# Patient Record
Sex: FEMALE | Race: WHITE | NOT HISPANIC OR LATINO | Employment: STUDENT | URBAN - METROPOLITAN AREA
[De-identification: names, ages, dates, MRNs, and addresses within clinical notes are randomized per-mention and may not be internally consistent; named-entity substitution may affect disease eponyms.]

---

## 2023-01-03 ENCOUNTER — OFFICE VISIT (OUTPATIENT)
Dept: FAMILY MEDICINE CLINIC | Facility: CLINIC | Age: 23
End: 2023-01-03
Payer: COMMERCIAL

## 2023-01-03 VITALS
HEART RATE: 74 BPM | OXYGEN SATURATION: 100 % | TEMPERATURE: 98.6 F | SYSTOLIC BLOOD PRESSURE: 103 MMHG | DIASTOLIC BLOOD PRESSURE: 71 MMHG | BODY MASS INDEX: 23.69 KG/M2 | HEIGHT: 65 IN | WEIGHT: 142.2 LBS

## 2023-01-03 DIAGNOSIS — Z78.9 HEPATITIS B IMMUNE: ICD-10-CM

## 2023-01-03 DIAGNOSIS — Z23 NEED FOR IMMUNIZATION AGAINST INFLUENZA: Primary | ICD-10-CM

## 2023-01-03 DIAGNOSIS — Z11.1 VISIT FOR TB SKIN TEST: ICD-10-CM

## 2023-01-03 DIAGNOSIS — R41.840 ATTENTION AND CONCENTRATION DEFICIT: ICD-10-CM

## 2023-01-03 DIAGNOSIS — Z11.59 NEED FOR HEPATITIS C SCREENING TEST: ICD-10-CM

## 2023-01-03 DIAGNOSIS — Z92.29 HISTORY OF MMR VACCINATION: ICD-10-CM

## 2023-01-03 LAB
HBV SURFACE AG SERPL QL IA: NORMAL
HCV AB SER DONR QL: NORMAL

## 2023-01-03 PROCEDURE — 99213 OFFICE O/P EST LOW 20 MIN: CPT | Performed by: NURSE PRACTITIONER

## 2023-01-03 PROCEDURE — 90686 IIV4 VACC NO PRSV 0.5 ML IM: CPT | Performed by: NURSE PRACTITIONER

## 2023-01-03 PROCEDURE — 86803 HEPATITIS C AB TEST: CPT | Performed by: NURSE PRACTITIONER

## 2023-01-03 PROCEDURE — 86765 RUBEOLA ANTIBODY: CPT | Performed by: NURSE PRACTITIONER

## 2023-01-03 PROCEDURE — 86735 MUMPS ANTIBODY: CPT | Performed by: NURSE PRACTITIONER

## 2023-01-03 PROCEDURE — 87340 HEPATITIS B SURFACE AG IA: CPT | Performed by: NURSE PRACTITIONER

## 2023-01-03 PROCEDURE — 90471 IMMUNIZATION ADMIN: CPT | Performed by: NURSE PRACTITIONER

## 2023-01-03 PROCEDURE — 86762 RUBELLA ANTIBODY: CPT | Performed by: NURSE PRACTITIONER

## 2023-01-03 NOTE — PROGRESS NOTES
Chief Complaint  Establish Care    Subjective         Jonathan Beck presents to [facility]  HPI     Here today to establish care- Needs to get flu vaccine and TB skin test.  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 Hard to stay focused when reading her assigned chapters.    PHQ-2 Total Score: 0  PHQ-9 Total Score: 0    Review of Systems    Social History     Socioeconomic History   • Marital status: Single   Tobacco Use   • Smoking status: Never   • Smokeless tobacco: Never        Objective     Vitals:    01/03/23 1317   BP: 103/71   BP Location: Right arm   Patient Position: Sitting   Cuff Size: Adult   Pulse: 74   Temp: 98.6 °F (37 °C)   TempSrc: Temporal   SpO2: 100%   Weight: 64.5 kg (142 lb 3.2 oz)   Height: 165.1 cm (65\")        Body mass index is 23.66 kg/m².    Wt Readings from Last 3 Encounters:   01/03/23 64.5 kg (142 lb 3.2 oz)   09/14/21 63.5 kg (140 lb)       BP Readings from Last 3 Encounters:   01/03/23 103/71   09/14/21 100/60       BMI is within normal parameters. No other follow-up for BMI required.    Physical Exam  Vitals reviewed.   Constitutional:       Appearance: Normal appearance.   HENT:      Head: Normocephalic and atraumatic.   Eyes:      Conjunctiva/sclera: Conjunctivae normal.      Pupils: Pupils are equal, round, and reactive to light.   Cardiovascular:      Rate and Rhythm: Normal rate and regular rhythm.      Pulses: Normal pulses.      Heart sounds: Normal heart sounds.   Pulmonary:      Effort: Pulmonary effort is normal.      Breath sounds: Normal breath sounds.   Abdominal:      General: Bowel sounds are normal.      Palpations: Abdomen is soft.   Musculoskeletal:      Cervical back: Normal range of motion.   Skin:     General: Skin is warm and dry.   Neurological:      Mental Status: She is alert and oriented to person, place, and time.    Psychiatric:         Mood and Affect: Mood normal.         Behavior: Behavior normal.         Thought Content: Thought content normal.         Judgment: Judgment normal.        Result Review :   The following data was reviewed by: AARON Reese on 01/03/2023:      Procedures    Assessment and Plan   Diagnoses and all orders for this visit:    1. Need for immunization against influenza (Primary)  -     FluLaval/Fluzone >6 mos (9200-9715)    2. Need for hepatitis C screening test  -     Hepatitis C Antibody    3. Attention and concentration deficit  -     Ambulatory Referral to Psychiatry    4. Hepatitis B immune  -     Hepatitis B Surface Antigen    5. History of MMR vaccination  -     Measles / Mumps / Rubella Immunity    6. Visit for TB skin test  -     TB Skin Test    .tmadd    Follow Up   Return if symptoms worsen or fail to improve.  Patient was given instructions and counseling regarding her condition or for health maintenance advice. Please see specific information pulled into the AVS if appropriate.     Please note that portions of this note were completed with a voice recognition program.    Electronically signed by AARON Reese  01/03/2023, 17:02 EST

## 2023-01-05 LAB
MEV IGG SER IA-ACNC: <13.5 AU/ML
MUV IGG SER IA-ACNC: 11.7 AU/ML
RUBV IGG SERPL IA-ACNC: <0.9 INDEX

## 2023-01-06 ENCOUNTER — CLINICAL SUPPORT (OUTPATIENT)
Dept: FAMILY MEDICINE CLINIC | Facility: CLINIC | Age: 23
End: 2023-01-06
Payer: COMMERCIAL

## 2023-01-06 DIAGNOSIS — Z23 NEED FOR TUBERCULOSIS VACCINATION: ICD-10-CM

## 2023-01-06 LAB
INDURATION: 0 MM (ref 0–10)
TB SKIN TEST: NORMAL

## 2023-01-06 PROCEDURE — 86580 TB INTRADERMAL TEST: CPT | Performed by: NURSE PRACTITIONER

## 2023-01-09 ENCOUNTER — PATIENT ROUNDING (BHMG ONLY) (OUTPATIENT)
Dept: FAMILY MEDICINE CLINIC | Facility: CLINIC | Age: 23
End: 2023-01-09
Payer: COMMERCIAL

## 2023-01-12 ENCOUNTER — OFFICE VISIT (OUTPATIENT)
Dept: PSYCHIATRY | Facility: CLINIC | Age: 23
End: 2023-01-12
Payer: COMMERCIAL

## 2023-01-12 ENCOUNTER — CLINICAL SUPPORT (OUTPATIENT)
Dept: FAMILY MEDICINE CLINIC | Facility: CLINIC | Age: 23
End: 2023-01-12
Payer: COMMERCIAL

## 2023-01-12 VITALS
SYSTOLIC BLOOD PRESSURE: 101 MMHG | WEIGHT: 142 LBS | BODY MASS INDEX: 23.66 KG/M2 | DIASTOLIC BLOOD PRESSURE: 51 MMHG | HEIGHT: 65 IN | HEART RATE: 74 BPM

## 2023-01-12 DIAGNOSIS — Z23 NEED FOR MEASLES-MUMPS-RUBELLA (MMR) VACCINE: Primary | ICD-10-CM

## 2023-01-12 DIAGNOSIS — F90.0 ATTENTION DEFICIT HYPERACTIVITY DISORDER (ADHD), PREDOMINANTLY INATTENTIVE TYPE: Primary | ICD-10-CM

## 2023-01-12 PROCEDURE — 90792 PSYCH DIAG EVAL W/MED SRVCS: CPT | Performed by: PSYCHIATRY & NEUROLOGY

## 2023-01-12 PROCEDURE — 90471 IMMUNIZATION ADMIN: CPT | Performed by: NURSE PRACTITIONER

## 2023-01-12 PROCEDURE — 90707 MMR VACCINE SC: CPT | Performed by: NURSE PRACTITIONER

## 2023-01-12 RX ORDER — ATOMOXETINE 40 MG/1
40 CAPSULE ORAL DAILY
Qty: 30 CAPSULE | Refills: 0 | Status: SHIPPED | OUTPATIENT
Start: 2023-01-12 | End: 2023-02-11

## 2023-01-12 NOTE — PROGRESS NOTES
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

## 2023-01-12 NOTE — PROGRESS NOTES
"[facility]  Initial Evaluation    Referring Provider:  Luz Smith, APRN  [address] [address]    Chief Complaint: \"I was just talking to my doctor about my focus ability with school... since I've been back from [facility], I've noticed it more... I am in a competitive program... I've never had to try as hard as I do now... I'm the one in the classroom that is so much more behind anyone else... if everyone else in the class has to study for an hour, I have to study for 2-3.\"    History of Present Illness: Jonathan Beck is a 22 y.o. female who presents today for initial evaluation regarding possible ADHD. She presents unaccompanied in no acute distress and engages with me appropriately.    History is positive for signs/symptoms suggestive of ADHD: trouble concentrating, trouble completing tasks, making errors in routine tasks, issues remembering obligations, trouble with organization, fidgeting, and associated irritability/anxiety with these deficits. No hx SI. Psychiatric screening is negative for pathognomonic history of hypomania/camelia. Dating boyfriend 2.5 years; this is going okay.    I have counseled the patient with regard to diagnoses and the recommended treatment regimen as documented below: I will begin atomoxetine per patient preference and have advised this medication can contribute to issues with BP and HR, appetite suppression, insomnia, and mood changes with worst case scenario being new-onset SI; patient contracts for safety appropriately. Patient acknowledges the diagnoses per my rendered interpretation. Patient demonstrates awareness/understanding of viable alternatives for treatment as well as potential risks, benefits, and side effects associated with this regimen and is amenable to proceed in this fashion.     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  Education: studying for OT  Pertinent developmental history: denies; no abuse hx  Pertinent legal history: denies  Hobbies/interests: spending time with friends  Faith: \"a little bit\" Anabaptism  Exercise: goes to a gym 4-5 times weekly  Dietary habits: defer  Sleep hygiene: defer  Social habits: no pertinent issues  Sunlight: no concern for under-exposure  Caffeine intake: no pertinent issues; 1 cup of coffee in AM  Hydration habits: no pertinent issues   history: denies    Social History     Socioeconomic History   • Marital status: Single   Tobacco Use   • Smoking status: Never   • Smokeless tobacco: Never   Vaping Use   • Vaping Use: Never used   Substance and Sexual Activity   • Alcohol use: Yes     Comment: SOCIALLY   • Drug use: Never   • Sexual activity: Yes     Partners: Male     Access to Firearms: denies    Tobacco use counseling/intervention: N/A, patient does not use tobacco; patient was counseled with regard to risks of tobacco use.    PHQ-9 Depression Screening  PHQ-9 Total Score: 1    Little interest or pleasure in doing things? 0-->not at all   Feeling down, depressed, or hopeless? 0-->not at all   Trouble falling or staying asleep, or sleeping too much? 0-->not at all   Feeling tired or having little energy? 0-->not at all   Poor appetite or overeating? 0-->not at all   Feeling bad about yourself - or that you are a failure or have let yourself or your family down? 0-->not at all   Trouble concentrating on things, such as reading the newspaper or watching television? 1-->several days   Moving or speaking so slowly that other people could have " noticed? Or the opposite - being so fidgety or restless that you have been moving around a lot more than usual? 0-->not at all   Thoughts that you would be better off dead, or of hurting yourself in some way? 0-->not at all   PHQ-9 Total Score 1     BEATRICE-7  Feeling nervous, anxious or on edge: Not at all  Not being able to stop or control worrying: Not at all  Worrying too much about different things: Not at all  Trouble Relaxing: Not at all  Being so restless that it is hard to sit still: Not at all  Feeling afraid as if something awful might happen: Not at all  Becoming easily annoyed or irritable: Not at all  BEATRICE 7 Total Score: 0    ASRS-v1.1  Part A  4/6  Part B  11/12    Total  15/18    Problem List:  Patient Active Problem List   Diagnosis   • Attention deficit hyperactivity disorder (ADHD), predominantly inattentive type     Allergy:   Allergies   Allergen Reactions   • Minocycline Hcl Hives      Discontinued Medications:  There are no discontinued medications.    Current Medications:   Current Outpatient Medications   Medication Sig Dispense Refill   • aspirin-acetaminophen-caffeine (EXCEDRIN MIGRAINE) 250-250-65 MG per tablet Take 1 tablet by mouth Every 6 (Six) Hours As Needed for Headache.     • cetirizine (zyrTEC) 10 MG tablet Take 10 mg by mouth Daily As Needed.     • atomoxetine (Strattera) 40 MG capsule Take 1 capsule by mouth Daily for 30 days. 30 capsule 0     No current facility-administered medications for this visit.     Past Medical History:  History reviewed. No pertinent past medical history.  Past Surgical History:  Past Surgical History:   Procedure Laterality Date   • TONSILLECTOMY       Family History:   History reviewed. No pertinent family history.    Mental Status Exam:   Appearance: well-groomed, sits upright, age-appropriate, normal habitus  Behavior: calm, cooperative, appropriate in demeanor, appropriate eye-contact  Mood/affect: euthymic / mood-congruent and appropriate in both  "range and amplitude  Speech: within expected variance; appropriate rate, appropriate rhythm, appropriate tone; non-pressured  Thought Process: linear, goal-directed; no FOI or ISHAN; abstraction intact  Thought Content: coherent, devoid of overt delusions/perceptual disturbances  SI/HI: denies both SI and HI; exhibits future-orientation, self-advocates appropriately, no regular self-harm, no appreciable intent  Memory: no overt deficits  Orientation: oriented to person/place/time/situation  Concentration: appropriate during interview, +subjective deficits  Intellectual capacity: presumptively average  Insight: fair by given history/exam  Judgment: appropriate by given history/exam  Psychomotor: no appreciable latency/retardation/agitation/tremor  Gait: WNL    Review of Systems:  Review of Systems   Constitutional: Negative for activity change, appetite change and unexpected weight change.   HENT: Negative for drooling.    Eyes: Negative for visual disturbance.   Respiratory: Negative for chest tightness and shortness of breath.    Cardiovascular: Negative for chest pain and palpitations.   Gastrointestinal: Negative for abdominal pain, constipation, diarrhea, nausea and vomiting.   Endocrine: Negative for cold intolerance and heat intolerance.   Genitourinary: Negative for difficulty urinating and frequency.   Musculoskeletal: Negative for myalgias and neck stiffness.   Skin: Negative for rash.   Neurological: Negative for dizziness, tremors, seizures and light-headedness.      Vital Signs:   /51   Pulse 74   Ht 165.1 cm (65\")   Wt 64.4 kg (142 lb)   BMI 23.63 kg/m²      Lab Results:   Office Visit on 01/03/2023   Component Date Value Ref Range Status   • TB Skin Test 01/06/2023 Passed   Final-Edited   • Induration 01/06/2023 0  0 - 10 mm Final-Edited   • Hepatitis C Ab 01/03/2023 Non-Reactive  Non-Reactive Final   • Rubella Antibodies, IgG 01/03/2023 <0.90 (L)  Immune >0.99 index Final                       "              Non-immune       <0.90                                  Equivocal  0.90 - 0.99                                  Immune           >0.99   • Rubeola IgG 01/03/2023 <13.5 (L)  Immune >16.4 AU/mL Final                                     Negative        <13.5                                   Equivocal 13.5 - 16.4                                   Positive        >16.4  Presence of antibodies to Rubeola is presumptive evidence  of immunity except when acute infection is suspected.   • Mumps IgG 01/03/2023 11.7  Immune >10.9 AU/mL Final                                    Negative         <9.0                                  Equivocal  9.0 - 10.9                                  Positive        >10.9  A positive result generally indicates past exposure to  Mumps virus or previous vaccination.   • Hepatitis B Surface Ag 01/03/2023 Non-Reactive  Non-Reactive Final     EKG Results:  No orders to display     Imaging Results:  No Images in the past 120 days found.    ASSESSMENT AND PLAN:    ICD-10-CM ICD-9-CM   1. Attention deficit hyperactivity disorder (ADHD), predominantly inattentive type  F90.0 314.00     22 y.o. female who presents today for initial evaluation regarding concern for ADHD. We have discussed the history and interpreted diagnoses as above as well as the treatment plan below, including potential R/B/SE of the recommended regimen of which the patient demonstrates understanding. Patient is agreeable to call 911 or go to the nearest ER should she become concerned for her own safety and/or the safety of those around her. There are no overt indices of acute camelia/psychosis on evaluation today. There are no medication side effects or related complaints today. CHINMAY reviewed and as expected.    1. Medication regimen: begin atomoxetine 40 mg QD; patient is advised not to misuse prescribed medications or to use any exogenous substances that aren't disclosed to this provider as they may interact with the  regimen to her detriment.   2. Risk Assessment: protracted risk is low, imminent risk is low.  Risk factors include: recent/ongoing psychosocial stressors. Protective factors include: no known family history of suicidality, intact reality testing, no substance use disorder, no access to firearms, no present SI, no stated history of suicide attempts or self-harm, patient's exhibited future-orientation, strong social support, and patient's cooperation with care. Do note that this is subject to change with the Catholic of new stressors, treatment non-adherence, use of substances, and/or new medical ails.  3. Monitoring: reviewed labs as populated above; PHQ-9 today is 1/27, BEATRICE-7 today is 0/21, ASRS today is 15/18.  4. Therapy: defer  5. Follow-up: 1 month  6. Communications: N/A    TREATMENT PLAN/GOALS: challenge patterns of living conducive to symptom burden, implement recommended regimen as above with augmentative, intermittent supportive psychotherapy to reduce symptom burden. Patient acknowledged and verbally consented to begin treatment as above. The importance of adherence to the recommended treatment and interval follow-up appointments was emphasized today. Patient was today advised to limit daily caffeine intake, hydrate appropriately, eat healthy and nutritious foods, engage sleep hygiene measures, engage appropriate exposure to sunlight, engage with hobbies in balance with life necessities, and exercise appropriate to their capacity to do so.     Billing: I have seen the patient today and considered her psychiatric complaints, rendered a diagnosis, and discussed treatment with the patient as above with which she consents.    Parts of this note are electronic transcriptions/translations of spoken language to printed text using the Dragon Dictation system.    Electronically signed by Colton Ramirez MD, 01/12/23, 8217

## 2023-01-12 NOTE — TREATMENT PLAN
Multi-Disciplinary Problems (from Behavioral Health Treatment Plan)    Active Problems     Problem: ADHD (Adult)  Start Date: 01/12/23    Problem Details: The patient self-scales this problem as a 7 with 10 being the worst.      Goal Priority Start Date Expected End Date End Date    Patient will sustain attention and concentration to complete chores, and work responsibilites and increase positive interaction in all relationships. -- 01/12/23 -- --    Goal Details: Progress toward goal:  Not appropriate to rate progress toward goal since this is the initial treatment plan.      Goal Intervention Frequency Start Date End Date    Assist patient in setting responsible goals and breaking down large tasks. PRN 01/12/23 --    Intervention Details: Duration of treatment until until remission of symptoms.      Goal Intervention Frequency Start Date End Date    Assist patient in using self monitoring checklist to improve attention, work performance, and social skills. PRN 01/12/23 --    Intervention Details: Duration of treatment until until remission of symptoms.                         I have discussed and reviewed this treatment plan with the patient.

## 2023-02-20 ENCOUNTER — TELEMEDICINE (OUTPATIENT)
Dept: PSYCHIATRY | Facility: CLINIC | Age: 23
End: 2023-02-20
Payer: COMMERCIAL

## 2023-02-20 DIAGNOSIS — F90.0 ATTENTION DEFICIT HYPERACTIVITY DISORDER (ADHD), PREDOMINANTLY INATTENTIVE TYPE: Primary | ICD-10-CM

## 2023-02-20 PROCEDURE — 99213 OFFICE O/P EST LOW 20 MIN: CPT | Performed by: PSYCHIATRY & NEUROLOGY

## 2023-02-20 RX ORDER — ATOMOXETINE 40 MG/1
40 CAPSULE ORAL DAILY
COMMUNITY
End: 2023-02-20 | Stop reason: SDUPTHER

## 2023-02-20 RX ORDER — ATOMOXETINE 60 MG/1
60 CAPSULE ORAL DAILY
Qty: 30 CAPSULE | Refills: 0 | Status: SHIPPED | OUTPATIENT
Start: 2023-02-20 | End: 2023-04-04

## 2023-02-20 NOTE — PROGRESS NOTES
"[facility]  Follow-up Visit    Chief Complaint: \"I was just talking to my doctor about my focus ability with school... since I've been back from [facility], I've noticed it more... I am in a competitive program... I've never had to try as hard as I do now... I'm the one in the classroom that is so much more behind anyone else... if everyone else in the class has to study for an hour, I have to study for 2-3.\"    History of Present Illness: Jonathan Beck is a 22 y.o. female who presents today for follow-up regarding ADHD. Last seen: 01/12 at which time atomoxetine was started. Services today rendered via telehealth ([employer]) for which the patient provided informed consent. Patient is aware she can refuse to be seen remotely at any time. Patient was located in a secure, remote environment (her room) as was the provider (in-office). I confirmed the patient's identity prior to evaluation and reiterated my credentials; there were no technical issues during the session today. She presents unaccompanied in no acute distress and engages with me appropriately. Psychotropic regimen perceived to be partially effective. Side-effects per given history: transient reduction to sleep that has since improved.    Current treatment regimen includes:   - atomoxetine 40 mg QD    Today the patient feels she's doing relatively well. Atomoxetine has been somewhat helpful with regard to engagement with roles and responsibilities. She has felt less stressed in general, feels coursework has been easier than expected. Thought process and content are devoid of overt aberration suggestive of acute camelia/psychosis. The patient denies SI/HI/AVH. There are no overt changes on exam today compared to most recent evaluation.  - contextual changes: returned to school; plans to go to the beach with some friends for Spring Break (in 2 weeks)  - sleep: no change; adequate  - appetite: no change; adequate    I have " counseled the patient with regard to diagnoses and the recommended treatment regimen as documented below. Patient acknowledges the diagnoses per my rendered interpretation. Patient demonstrates understanding of potential risks/benefits/side effects associated with this regimen and is amenable to proceed in this fashion.     Psychotherapy  - Time: 9 minutes  - interventions employed: the therapeutic alliance was strengthened to encourage the patient to express their thoughts and feelings freely. Esteem building was enhanced through praise, reassurance, normalizing/challenging, and encouragement as appropriate. Coping skills were enhanced to build distress tolerance skills and emotional regulation. Allowed patient to freely discuss issues without interruption or judgement with unconditional positive regard, active listening skills, and empathy. Provided a safe, confidential environment to facilitate the development of a positive therapeutic relationship and encourage open, honest communication. Assisted patient in processing session content; acknowledged and normalized/addressed, as appropriate, patient’s thoughts, feelings, and concerns by utilizing a person-centered approach in efforts to build appropriate rapport and a positive therapeutic relationship with open and honest communication.   - Diagnoses: see assessment and plan below  - Symptoms: see subjective above  - Goals   - patient: cultivate focus/concentration towards educational/vocational ends, function more adeptly in roles   - provider: challenge patterns of living conducive to pathology, strengthen defenses, promote problems solving, restore adaptive functioning and provide symptom relief.  - Treatment plan: continue supportive psychotherapy in subsequent appointments to provide symptom relief; see assessment and plan below for additional details:   - iteration: 1   - progress: good   - (X)illumination, (X)contextualization, (X)detection,  "(working)development, (-)elaboration, (-)refinement  - functional status: good  - mental status exam: as below  - prognosis: good    Psychiatric History:  Diagnoses: N/A  Outpatient history: denies  Inpatient history: denies  Medication trials: denies  Other treatment modalities: no therapy hx outside of studies  Presenting regimen: N/A  Self harm: denies  Suicide attempts: denies     Social History:  Residence: lives in an apartment with three roommates (things are okay); stays back home during breaks from school  Vocation: substitute teaching  Education: studying for OT (has about 3.5 years left)  Pertinent developmental history: denies; no abuse hx  Pertinent legal history: denies  Hobbies/interests: spending time with friends  Taoism: \"a little bit\" Episcopal  Exercise: goes to a gym 4-5 times weekly  Dietary habits: defer  Sleep hygiene: defer  Social habits: no pertinent issues  Sunlight: no concern for under-exposure  Caffeine intake: no pertinent issues; 1 cup of coffee in AM  Hydration habits: no pertinent issues   history: denies    Social History     Socioeconomic History   • Marital status: Single   Tobacco Use   • Smoking status: Never   • Smokeless tobacco: Never   Vaping Use   • Vaping Use: Never used   Substance and Sexual Activity   • Alcohol use: Yes     Comment: SOCIALLY   • Drug use: Never   • Sexual activity: Yes     Partners: Male     Tobacco use counseling/intervention: N/A, patient does not use tobacco; patient has been counseled with regard to risks of tobacco use.    PHQ-9 Depression Screening  PHQ-9 Total Score:      Little interest or pleasure in doing things?     Feeling down, depressed, or hopeless?     Trouble falling or staying asleep, or sleeping too much?     Feeling tired or having little energy?     Poor appetite or overeating?     Feeling bad about yourself - or that you are a failure or have let yourself or your family down?     Trouble concentrating on things, such as " reading the newspaper or watching television?     Moving or speaking so slowly that other people could have noticed? Or the opposite - being so fidgety or restless that you have been moving around a lot more than usual?     Thoughts that you would be better off dead, or of hurting yourself in some way?     PHQ-9 Total Score       Change in PHQ-9 since last measure: N/A (1)    BEATRICE-7       Change in BEATRICE-7 since last measure: N/A (0)    Problem List:  Patient Active Problem List   Diagnosis   • Attention deficit hyperactivity disorder (ADHD), predominantly inattentive type     Allergy:   Allergies   Allergen Reactions   • Minocycline Hcl Hives        Discontinued Medications:  There are no discontinued medications.    Current Medications:   Current Outpatient Medications   Medication Sig Dispense Refill   • aspirin-acetaminophen-caffeine (EXCEDRIN MIGRAINE) 250-250-65 MG per tablet Take 1 tablet by mouth Every 6 (Six) Hours As Needed for Headache.     • atomoxetine (STRATTERA) 40 MG capsule Take 40 mg by mouth Daily.     • cetirizine (zyrTEC) 10 MG tablet Take 10 mg by mouth Daily As Needed.       No current facility-administered medications for this visit.     Past Medical History:  History reviewed. No pertinent past medical history.  Past Surgical History:  Past Surgical History:   Procedure Laterality Date   • TONSILLECTOMY       Mental Status Exam:   Appearance: well-groomed, sits upright, age-appropriate, normal habitus  Behavior: calm, cooperative, appropriate in demeanor, appropriate eye-contact  Mood/affect: euthymic / mood-congruent and appropriate in both range and amplitude  Speech: within expected variance; appropriate rate, appropriate rhythm, appropriate tone; non-pressured  Thought Process: linear, goal-directed; no FOI or ISHAN; abstraction intact  Thought Content: coherent, devoid of overt delusions/perceptual disturbances  SI/HI: denies both SI and HI; exhibits future-orientation, self-advocates  appropriately, no regular self-harm, no appreciable intent  Memory: no overt deficits  Orientation: oriented to person/place/time/situation  Concentration: appropriate during interview  Intellectual capacity: presumptively average  Insight: fair by given history/exam  Judgment: appropriate by given history/exam  Psychomotor: no appreciable latency/retardation/agitation/tremor  Gait: deferred    Review of Systems:  Review of Systems   Constitutional: Negative for activity change, appetite change and unexpected weight change.   HENT: Negative for drooling.    Eyes: Negative for visual disturbance.   Respiratory: Negative for chest tightness and shortness of breath.    Cardiovascular: Negative for chest pain and palpitations.   Gastrointestinal: Negative for abdominal pain, diarrhea and nausea.   Endocrine: Negative for cold intolerance and heat intolerance.   Genitourinary: Negative for difficulty urinating and frequency.   Musculoskeletal: Negative for myalgias and neck stiffness.   Skin: Negative for rash.   Neurological: Negative for dizziness, tremors, seizures and light-headedness.     Vital Signs:   There were no vitals taken for this visit.     Lab Results:   Admission on 02/06/2023, Discharged on 02/06/2023   Component Date Value Ref Range Status   • Rapid Influenza A Ag 02/06/2023 Negative   Final   • Rapid Influenza B Ag 02/06/2023 Negative   Final   • Internal Control 02/06/2023 Passed   Final   • Lot Number 02/06/2023 [id number]   Final   • Expiration Date 02/06/2023 7/31/24   Final   Office Visit on 01/03/2023   Component Date Value Ref Range Status   • TB Skin Test 01/06/2023 Passed   Final-Edited   • Induration 01/06/2023 0  0 - 10 mm Final-Edited   • Hepatitis C Ab 01/03/2023 Non-Reactive  Non-Reactive Final   • Rubella Antibodies, IgG 01/03/2023 <0.90 (L)  Immune >0.99 index Final                                    Non-immune       <0.90                                  Equivocal  0.90 - 0.99                                   Immune           >0.99   • Rubeola IgG 01/03/2023 <13.5 (L)  Immune >16.4 AU/mL Final                                     Negative        <13.5                                   Equivocal 13.5 - 16.4                                   Positive        >16.4  Presence of antibodies to Rubeola is presumptive evidence  of immunity except when acute infection is suspected.   • Mumps IgG 01/03/2023 11.7  Immune >10.9 AU/mL Final                                    Negative         <9.0                                  Equivocal  9.0 - 10.9                                  Positive        >10.9  A positive result generally indicates past exposure to  Mumps virus or previous vaccination.   • Hepatitis B Surface Ag 01/03/2023 Non-Reactive  Non-Reactive Final     EKG Results:  No orders to display     Imaging Results:  No Images in the past 120 days found.    ASSESSMENT AND PLAN:    ICD-10-CM ICD-9-CM   1. Attention deficit hyperactivity disorder (ADHD), predominantly inattentive type  F90.0 314.00     22 y.o. female who presents today for follow-up regarding ADHD. We have discussed the interval history and the treatment plan below, including potential R/B/SE of the recommended regimen of which the patient demonstrates understanding. Patient is agreeable to call 911 or go to the nearest ER should she become concerned for her own safety and/or the safety of those around her. There are no medication side effects or related complaints today. There are no overt indices of acute camelia/psychosis on exam today.     1. Medication regimen: titrate atomoxetine to 60 mg QD; patient is advised not to misuse prescribed medications or to use them with any exogenous substances that aren't disclosed to this provider as they may interact with the regimen to the patient's detriment.   2. Risk Assessment: protracted risk is low, imminent risk is low - no interval change. Do note that this is subject to change with the Buddhism of  new stressors, treatment non-adherence, use of substances, and/or new medical ails.   3. Monitoring: reviewed labs as populated above  4. Therapy: defer  5. Follow-up: 1 month  6. Communications: N/A    TREATMENT PLAN/GOALS: challenge patterns of living conducive to symptom burden, implement recommended regimen as above with augmentative, intermittent supportive psychotherapy to reduce symptom burden. Patient acknowledged and verbally consented to continue treatment. The importance of adherence to the recommended treatment and interval follow-up appointments was again emphasized today: patient has good treatment adherence per given history. Patient was today reminded to limit daily caffeine intake, hydrate appropriately, eat healthy and nutritious foods, engage sleep hygiene measures, engage appropriate exposure to sunlight, engage with hobbies in balance with life necessities, and exercise appropriate to their capacity to do so.     Billing: This encounter is of low complexity based on number/complexity of problems addressed today and risk of complications/morbidity: 1 stable chronic illness and low risk of complications/morbidity.     Parts of this note are electronic transcriptions/translations of spoken language to printed text using the Dragon Dictation system.    Electronically signed by Colton Ramirez MD, 02/20/23, [id number]

## 2023-04-04 DIAGNOSIS — F90.0 ATTENTION DEFICIT HYPERACTIVITY DISORDER (ADHD), PREDOMINANTLY INATTENTIVE TYPE: ICD-10-CM

## 2023-04-04 RX ORDER — ATOMOXETINE 60 MG/1
CAPSULE ORAL
Qty: 30 CAPSULE | Refills: 0 | Status: SHIPPED | OUTPATIENT
Start: 2023-04-04 | End: 2023-04-05 | Stop reason: SDUPTHER

## 2023-04-04 NOTE — TELEPHONE ENCOUNTER
Requested: ATOMOXETINE HCL 60 MG CAPSULE  •  Last refill:2/20/2023     NEXT FOLLOW UP IN Epic 04/05/2023

## 2023-04-05 ENCOUNTER — TELEMEDICINE (OUTPATIENT)
Dept: PSYCHIATRY | Facility: CLINIC | Age: 23
End: 2023-04-05
Payer: COMMERCIAL

## 2023-04-05 DIAGNOSIS — F90.0 ATTENTION DEFICIT HYPERACTIVITY DISORDER (ADHD), PREDOMINANTLY INATTENTIVE TYPE: Primary | ICD-10-CM

## 2023-04-05 PROCEDURE — 99213 OFFICE O/P EST LOW 20 MIN: CPT | Performed by: PSYCHIATRY & NEUROLOGY

## 2023-04-05 RX ORDER — ATOMOXETINE 60 MG/1
60 CAPSULE ORAL DAILY
Qty: 90 CAPSULE | Refills: 1 | Status: SHIPPED | OUTPATIENT
Start: 2023-04-05

## 2023-04-05 NOTE — PROGRESS NOTES
"[facility]  Follow-up Visit    Chief Complaint: \"I was just talking to my doctor about my focus ability with school... since I've been back from [facility], I've noticed it more... I am in a competitive program... I've never had to try as hard as I do now... I'm the one in the classroom that is so much more behind anyone else... if everyone else in the class has to study for an hour, I have to study for 2-3.\"    History of Present Illness: Jonathan Beck is a 22 y.o. female who presents today for follow-up regarding ADHD. Last seen: 02/20 at which time atomoxetine was titrated. Services today rendered via telehealth ([employer]) for which the patient provided informed consent; provided this again today, 04/05. Patient is aware she can refuse to be seen remotely at any time. Patient was located in a secure, remote environment (her room) as was the provider (in-office). I confirmed the patient's identity prior to evaluation and reiterated my credentials; there were no technical issues during the session today. She presents unaccompanied in no acute distress and engages with me appropriately. Psychotropic regimen perceived to be partially effective. Side-effects per given history: denies.    Current treatment regimen includes:   - atomoxetine 60 mg QD    Today the patient feels things have been going well, feels medication is more effective at current dosing with regard to focus/concentration and task management. She has felt less stressed in general, feels coursework has been easier than expected. Mood is good, anxiety is well-managed. Thought process and content are devoid of overt aberration suggestive of acute camelia/psychosis. The patient denies SI/HI/AVH. There are no overt changes on exam today compared to most recent evaluation.  - contextual changes: had fun at the beach, has been busy with school  - sleep: no change; adequate  - appetite: no change; adequate    I have " counseled the patient with regard to diagnoses and the recommended treatment regimen as documented below. Patient acknowledges the diagnoses per my rendered interpretation. Patient demonstrates understanding of potential risks/benefits/side effects associated with this regimen and is amenable to proceed in this fashion.     Psychotherapy  - Time: 7 minutes  - interventions employed: the therapeutic alliance was strengthened to encourage the patient to express their thoughts and feelings freely. Esteem building was enhanced through praise, reassurance, normalizing/challenging, and encouragement as appropriate. Coping skills were enhanced to build distress tolerance skills and emotional regulation. Allowed patient to freely discuss issues without interruption or judgement with unconditional positive regard, active listening skills, and empathy. Provided a safe, confidential environment to facilitate the development of a positive therapeutic relationship and encourage open, honest communication. Assisted patient in processing session content; acknowledged and normalized/addressed, as appropriate, patient’s thoughts, feelings, and concerns by utilizing a person-centered approach in efforts to build appropriate rapport and a positive therapeutic relationship with open and honest communication.   - Diagnoses: see assessment and plan below  - Symptoms: see subjective above  - Goals   - patient: cultivate focus/concentration towards educational/vocational ends, function more adeptly in roles   - provider: challenge patterns of living conducive to pathology, strengthen defenses, promote problems solving, restore adaptive functioning and provide symptom relief.  - Treatment plan: continue supportive psychotherapy in subsequent appointments to provide symptom relief; see assessment and plan below for additional details:   - iteration: 1   - progress: good   - (X)illumination, (X)contextualization, (X)detection,  "(working)development, (-)elaboration, (-)refinement  - functional status: good  - mental status exam: as below  - prognosis: good    Psychiatric History:  Diagnoses: N/A  Outpatient history: denies  Inpatient history: denies  Medication trials: denies  Other treatment modalities: no therapy hx outside of studies  Presenting regimen: N/A  Self harm: denies  Suicide attempts: denies     Social History:  Residence: lives in an apartment with three roommates (things are okay); stays back home during breaks from school  Vocation: substitute teaching  Education: studying for OT (has about 3.5 years left)  Pertinent developmental history: denies; no abuse hx  Pertinent legal history: denies  Hobbies/interests: spending time with friends  Zoroastrianism: \"a little bit\" [employer]ism  Exercise: goes to a gym 4-5 times weekly  Dietary habits: defer  Sleep hygiene: defer  Social habits: no pertinent issues  Sunlight: no concern for under-exposure  Caffeine intake: no pertinent issues; 1 cup of coffee in AM  Hydration habits: no pertinent issues   history: denies    Social History     Socioeconomic History   • Marital status: Single   Tobacco Use   • Smoking status: Never   • Smokeless tobacco: Never   Vaping Use   • Vaping Use: Never used   Substance and Sexual Activity   • Alcohol use: Yes     Comment: SOCIALLY   • Drug use: Never   • Sexual activity: Yes     Partners: Male     Tobacco use counseling/intervention: N/A, patient does not use tobacco; patient has been counseled with regard to risks of tobacco use.    PHQ-9 Depression Screening  PHQ-9 Total Score:      Little interest or pleasure in doing things?     Feeling down, depressed, or hopeless?     Trouble falling or staying asleep, or sleeping too much?     Feeling tired or having little energy?     Poor appetite or overeating?     Feeling bad about yourself - or that you are a failure or have let yourself or your family down?     Trouble concentrating on things, such as " reading the newspaper or watching television?     Moving or speaking so slowly that other people could have noticed? Or the opposite - being so fidgety or restless that you have been moving around a lot more than usual?     Thoughts that you would be better off dead, or of hurting yourself in some way?     PHQ-9 Total Score       Change in PHQ-9 since last measure: N/A (1)    BEATRICE-7       Change in BEATRICE-7 since last measure: N/A (0)    Problem List:  Patient Active Problem List   Diagnosis   • Attention deficit hyperactivity disorder (ADHD), predominantly inattentive type     Allergy:   Allergies   Allergen Reactions   • Minocycline Hcl Hives        Discontinued Medications:  Medications Discontinued During This Encounter   Medication Reason   • atomoxetine (STRATTERA) 60 MG capsule Reorder       Current Medications:   Current Outpatient Medications   Medication Sig Dispense Refill   • atomoxetine (STRATTERA) 60 MG capsule Take 1 capsule by mouth Daily. 90 capsule 1   • aspirin-acetaminophen-caffeine (EXCEDRIN MIGRAINE) 250-250-65 MG per tablet Take 1 tablet by mouth Every 6 (Six) Hours As Needed for Headache.     • cetirizine (zyrTEC) 10 MG tablet Take 10 mg by mouth Daily As Needed.       No current facility-administered medications for this visit.     Past Medical History:  No past medical history on file.  Past Surgical History:  Past Surgical History:   Procedure Laterality Date   • TONSILLECTOMY       Mental Status Exam:   Appearance: well-groomed, sits upright, age-appropriate, normal habitus  Behavior: calm, cooperative, appropriate in demeanor, appropriate eye-contact  Mood/affect: euthymic / mood-congruent and appropriate in both range and amplitude  Speech: within expected variance; appropriate rate, appropriate rhythm, appropriate tone; non-pressured  Thought Process: linear, goal-directed; no FOI or ISHAN; abstraction intact  Thought Content: coherent, devoid of overt delusions/perceptual  disturbances  SI/HI: denies both SI and HI; exhibits future-orientation, self-advocates appropriately, no regular self-harm, no appreciable intent  Memory: no overt deficits  Orientation: oriented to person/place/time/situation  Concentration: appropriate during interview  Intellectual capacity: presumptively average  Insight: fair by given history/exam  Judgment: appropriate by given history/exam  Psychomotor: no appreciable latency/retardation/agitation/tremor  Gait: deferred    Vital Signs:   There were no vitals taken for this visit.     Lab Results:   Admission on 02/06/2023, Discharged on 02/06/2023   Component Date Value Ref Range Status   • Rapid Influenza A Ag 02/06/2023 Negative   Final   • Rapid Influenza B Ag 02/06/2023 Negative   Final   • Internal Control 02/06/2023 Passed   Final   • Lot Number 02/06/2023 [id number]   Final   • Expiration Date 02/06/2023 7/31/24   Final   Office Visit on 01/03/2023   Component Date Value Ref Range Status   • TB Skin Test 01/06/2023 Passed   Final-Edited   • Induration 01/06/2023 0  0 - 10 mm Final-Edited   • Hepatitis C Ab 01/03/2023 Non-Reactive  Non-Reactive Final   • Rubella Antibodies, IgG 01/03/2023 <0.90 (L)  Immune >0.99 index Final                                    Non-immune       <0.90                                  Equivocal  0.90 - 0.99                                  Immune           >0.99   • Rubeola IgG 01/03/2023 <13.5 (L)  Immune >16.4 AU/mL Final                                     Negative        <13.5                                   Equivocal 13.5 - 16.4                                   Positive        >16.4  Presence of antibodies to Rubeola is presumptive evidence  of immunity except when acute infection is suspected.   • Mumps IgG 01/03/2023 11.7  Immune >10.9 AU/mL Final                                    Negative         <9.0                                  Equivocal  9.0 - 10.9                                  Positive        >10.9  A  positive result generally indicates past exposure to  Mumps virus or previous vaccination.   • Hepatitis B Surface Ag 01/03/2023 Non-Reactive  Non-Reactive Final     EKG Results:  No orders to display     Imaging Results:  No Images in the past 120 days found.    ASSESSMENT AND PLAN:    ICD-10-CM ICD-9-CM   1. Attention deficit hyperactivity disorder (ADHD), predominantly inattentive type  F90.0 314.00     22 y.o. female who presents today for follow-up regarding ADHD. We have discussed the interval history and the treatment plan below, including potential R/B/SE of the recommended regimen of which the patient demonstrates understanding. Patient is agreeable to call 911 or go to the nearest ER should she become concerned for her own safety and/or the safety of those around her. There are no medication side effects or related complaints today. There are no overt indices of acute camelia/psychosis on exam today.     1. Medication regimen: continue atomoxetine; patient is advised not to misuse prescribed medications or to use them with any exogenous substances that aren't disclosed to this provider as they may interact with the regimen to the patient's detriment.   2. Risk Assessment: protracted risk is low, imminent risk is low - no interval change. Do note that this is subject to change with the Jewish of new stressors, treatment non-adherence, use of substances, and/or new medical ails.   3. Monitoring: reviewed labs as populated above  4. Therapy: defer  5. Follow-up: 6 months  6. Communications: N/A    TREATMENT PLAN/GOALS: challenge patterns of living conducive to symptom burden, implement recommended regimen as above with augmentative, intermittent supportive psychotherapy to reduce symptom burden. Patient acknowledged and verbally consented to continue treatment. The importance of adherence to the recommended treatment and interval follow-up appointments was again emphasized today: patient has good treatment  adherence per given history. Patient was today reminded to limit daily caffeine intake, hydrate appropriately, eat healthy and nutritious foods, engage sleep hygiene measures, engage appropriate exposure to sunlight, engage with hobbies in balance with life necessities, and exercise appropriate to their capacity to do so.     Billing: This encounter is of low complexity based on number/complexity of problems addressed today and risk of complications/morbidity: 1 stable chronic illness and low risk of complications/morbidity.     Parts of this note are electronic transcriptions/translations of spoken language to printed text using the Dragon Dictation system.    Electronically signed by Colton Ramirez MD, 04/05/23, [id number]

## 2024-12-10 ENCOUNTER — TELEPHONE (OUTPATIENT)
Dept: FAMILY MEDICINE CLINIC | Facility: CLINIC | Age: 24
End: 2024-12-10
Payer: COMMERCIAL

## 2024-12-10 DIAGNOSIS — Z02.0 ENCOUNTER FOR SCHOOL EXAMINATION: Primary | ICD-10-CM

## 2024-12-10 NOTE — TELEPHONE ENCOUNTER
Patient states she needs a drug screening for school and was wondering if you could place the order for her to get this screening done. Please advise

## 2024-12-10 NOTE — TELEPHONE ENCOUNTER
Caller: Jonathan Beck    Relationship: Self    Best call back number: [phone]     What orders are you requesting (i.e. lab or imaging): DRUG SCREENING    In what timeframe would the patient need to come in: ASAP    Where will you receive your lab/imaging services: [facility]    PATIENT WOULD LIKE A CALL WHEN THE ORDER IS PLACED

## 2024-12-11 ENCOUNTER — LAB (OUTPATIENT)
Dept: LAB | Facility: HOSPITAL | Age: 24
End: 2024-12-11
Payer: COMMERCIAL

## 2024-12-11 DIAGNOSIS — Z02.0 ENCOUNTER FOR SCHOOL EXAMINATION: ICD-10-CM

## 2024-12-11 PROCEDURE — 36415 COLL VENOUS BLD VENIPUNCTURE: CPT

## 2024-12-11 PROCEDURE — 80307 DRUG TEST PRSMV CHEM ANLYZR: CPT

## 2024-12-17 LAB
AMPHETAMINES SERPL QL SCN: NEGATIVE NG/ML
BARBITURATES SERPL QL SCN: NEGATIVE UG/ML
BENZODIAZ SERPL QL SCN: NEGATIVE NG/ML
CANNABINOIDS SERPL QL SCN: NEGATIVE NG/ML
COCAINE+BZE SERPL QL SCN: NEGATIVE NG/ML
METHADONE SERPL QL SCN: NEGATIVE NG/ML
OPIATES SERPL QL SCN: NEGATIVE NG/ML
OXYCODONE+OXYMORPHONE SERPLBLD QL SCN: NEGATIVE NG/ML
PCP SERPL QL SCN: NEGATIVE NG/ML
PROPOXYPH SERPL QL SCN: NEGATIVE NG/ML